# Patient Record
Sex: FEMALE | URBAN - METROPOLITAN AREA
[De-identification: names, ages, dates, MRNs, and addresses within clinical notes are randomized per-mention and may not be internally consistent; named-entity substitution may affect disease eponyms.]

---

## 2023-08-30 ENCOUNTER — APPOINTMENT (RX ONLY)
Dept: URBAN - METROPOLITAN AREA CLINIC 125 | Facility: CLINIC | Age: 63
Setting detail: DERMATOLOGY
End: 2023-08-30

## 2023-08-30 DIAGNOSIS — E88.1 LIPODYSTROPHY, NOT ELSEWHERE CLASSIFIED: ICD-10-CM

## 2023-08-30 DIAGNOSIS — Z41.9 ENCOUNTER FOR PROCEDURE FOR PURPOSES OTHER THAN REMEDYING HEALTH STATE, UNSPECIFIED: ICD-10-CM

## 2023-08-30 PROCEDURE — ? ADDITIONAL NOTES

## 2023-08-30 PROCEDURE — ? COSMETIC CONSULTATION: FILLERS

## 2023-08-30 PROCEDURE — ? COUNSELING

## 2023-08-30 PROCEDURE — 99202 OFFICE O/P NEW SF 15 MIN: CPT

## 2023-08-30 ASSESSMENT — LOCATION DETAILED DESCRIPTION DERM
LOCATION DETAILED: RIGHT INFERIOR LATERAL MALAR CHEEK
LOCATION DETAILED: RIGHT LATERAL MALAR CHEEK

## 2023-08-30 ASSESSMENT — LOCATION ZONE DERM: LOCATION ZONE: FACE

## 2023-08-30 ASSESSMENT — LOCATION SIMPLE DESCRIPTION DERM: LOCATION SIMPLE: RIGHT CHEEK

## 2023-08-30 NOTE — HPI: COSMETIC CONSULTATION
When Outside In The Sun, Do You...: rarely burns, mostly tans
Additional History: Patient states she was diagnosed with TMJ 35 years ago and concerned about treatment for right cheek. She states she has prominent features on the right side due to constant manipulation of the area when she was younger due to pain.

## 2023-08-30 NOTE — PROCEDURE: ADDITIONAL NOTES
Render Risk Assessment In Note?: no
Detail Level: Simple
Additional Notes: :\\n- Discussed risks and benefits of filler including risks of vascular occlusion resulting in skin necrosis. Patient denies any recent or upcoming dental procedures or history of autoimmune disease. Patient inquired about filler exacerbating her TMJ, discussed that it will not likely interact with TMJ due to it being related to the bone and filler will be placed subdermally. \\n- Discussed subcision and thread filler through the right cheek. (Volbella - quoted $450 vs. Vollure) \\n- Recommended microneedling (for reducing appearance of pores) and BBL (for tone and texture). She inquired about increasing strength of prescription for tretinoin and hydroquinone at this time.

## 2023-08-30 NOTE — PROCEDURE: MIPS QUALITY
Quality 431: Preventive Care And Screening: Unhealthy Alcohol Use - Screening: Patient not identified as an unhealthy alcohol user when screened for unhealthy alcohol use using a systematic screening method
Detail Level: Detailed
Quality 130: Documentation Of Current Medications In The Medical Record: Current Medications Documented
Quality 110: Preventive Care And Screening: Influenza Immunization: Influenza Immunization not Administered because Patient Refused.
Quality 402: Tobacco Use And Help With Quitting Among Adolescents: Patient screened for tobacco and is an ex-smoker

## 2023-08-31 ENCOUNTER — APPOINTMENT (RX ONLY)
Dept: URBAN - METROPOLITAN AREA CLINIC 125 | Facility: CLINIC | Age: 63
Setting detail: DERMATOLOGY
End: 2023-08-31

## 2023-08-31 DIAGNOSIS — Z41.9 ENCOUNTER FOR PROCEDURE FOR PURPOSES OTHER THAN REMEDYING HEALTH STATE, UNSPECIFIED: ICD-10-CM

## 2023-08-31 PROCEDURE — ? SUBCISION

## 2023-08-31 PROCEDURE — ? JUVEDERM VOLLURE XC INJECTION

## 2023-08-31 ASSESSMENT — LOCATION ZONE DERM: LOCATION ZONE: FACE

## 2023-08-31 ASSESSMENT — LOCATION DETAILED DESCRIPTION DERM
LOCATION DETAILED: RIGHT INFERIOR LATERAL MALAR CHEEK
LOCATION DETAILED: RIGHT INFERIOR CENTRAL MALAR CHEEK

## 2023-08-31 ASSESSMENT — LOCATION SIMPLE DESCRIPTION DERM: LOCATION SIMPLE: RIGHT CHEEK

## 2023-08-31 NOTE — PROCEDURE: SUBCISION
Detail Level: Simple
Treatment Number: 1
Incision Prior To Subcision?: no
Subcision Instrument: 22G cannula
Anesthesia Type: 1% lidocaine with 1:100,000 epinephrine and a 1:10 solution of 8.4% sodium bicarbonate
Anesthesia Volume In Cc: 2
Hemostasis: None
Wound Care: Petrolatum
Consent was obtained from the patient. The risks and benefits to therapy were discussed in detail. Specifically, the risks of infection, scarring, bleeding, prolonged wound healing, incomplete removal, allergy to anesthesia, nerve injury and recurrence were addressed. Prior to the procedure, the treatment site was clearly identified and confirmed by the patient. All components of Universal Protocol/PAUSE Rule completed.
Pre-Procedure Prep: Prior to preparing the skin, the patient identified the scars they would like treated within the treatment area.  A small incision was made with a scalpel immediately adjacent to these areas
Post-Care Instructions: I reviewed with the patient in detail post-care instructions. Patient is to keep the biopsy site dry overnight, and then apply bacitracin twice daily until healed. Patient may apply hydrogen peroxide soaks to remove any crusting.
Treatment Number: 0
Pre-Procedure Prep: Prior to preparing the skin, the patient identified the scars they would like treated within the treatment area.  A cannula was used to for procedure.
Post-Care Instructions: Patient given ice pack for bruising and discomfort.

## 2023-08-31 NOTE — PROCEDURE: JUVEDERM VOLLURE XC INJECTION
Post-Care Instructions: Patient instructed to apply ice to reduce swelling. 25G cannula was used for filler as well as for subcision (unable to change to 25G cannula on procedure note).
Dorsal Hands Filler  Volume In Cc: 0
Include Cannula Information In Note?: Yes
Filler: Juvederm Vollure XC
Include Cannula Size?: 25G
Price (Use Numbers Only, No Special Characters Or $): 262
Use Map Statement For Sites (Optional): No
Anesthesia Type: 1% lidocaine with epinephrine
Number Of Syringes (Required For Inventory): 1
Expiration Date (Month Year): 2023-12-05
Additional Anesthesia Volume In Cc: 6
Detail Level: Detailed
Cheeks Filler Volume In Cc: 0.3
Anesthesia Volume In Cc: 0.5
Consent: Written consent obtained. Risks include but not limited to bruising, beading, irregular texture, ulceration, infection, allergic reaction, scar formation, incomplete augmentation, temporary nature, procedural pain.
Map Statment: See Attach Map for Complete Details
Lot #: [id number]
Procedural Text: The filler was administered to the treatment areas noted above.